# Patient Record
Sex: FEMALE | Race: WHITE | NOT HISPANIC OR LATINO | Employment: UNEMPLOYED | URBAN - METROPOLITAN AREA
[De-identification: names, ages, dates, MRNs, and addresses within clinical notes are randomized per-mention and may not be internally consistent; named-entity substitution may affect disease eponyms.]

---

## 2022-03-14 ENCOUNTER — TELEPHONE (OUTPATIENT)
Dept: UROGYNECOLOGY | Facility: CLINIC | Age: 37
End: 2022-03-14
Payer: COMMERCIAL

## 2022-03-14 NOTE — TELEPHONE ENCOUNTER
----- Message from Cassandra Jaime sent at 3/14/2022  4:27 PM CDT -----  Contact: pt  Type: Needs Medical Advice    Who: Called: Pt     Best Call Back Number: [phone]    Inquiry/Question: pt states her Dr was sending over a referral last Tuesday and is trying to see if you have received it yet please advise  Thank you~

## 2022-06-30 ENCOUNTER — TELEPHONE (OUTPATIENT)
Dept: UROGYNECOLOGY | Facility: CLINIC | Age: 37
End: 2022-06-30
Payer: COMMERCIAL

## 2022-06-30 NOTE — TELEPHONE ENCOUNTER
----- Message from Keiry Bender sent at 6/30/2022  1:52 PM CDT -----  Contact: Faustina aranda/ Dr. Styles  Type: Needs Medical Advice    Who Called:  Faustina    Best Call Back Number: [phone]  '  Additional Information: Please call back regarding seeing patient for UTIs.  Thanks.

## 2022-06-30 NOTE — TELEPHONE ENCOUNTER
Called and informed that we do not do  Kidney stones or chronic uti's that would be referred to urology, we do prolapse bladders, cystocele, rectocele I.C. all conditions of bladder , pelvic floor disorders. states understanding

## 2024-07-09 DIAGNOSIS — Z00.00 ROUTINE HEALTH MAINTENANCE: Primary | ICD-10-CM

## 2024-07-09 DIAGNOSIS — Z76.89 ENCOUNTER TO ESTABLISH CARE: ICD-10-CM

## 2024-09-04 ENCOUNTER — OFFICE VISIT (OUTPATIENT)
Dept: CARDIOLOGY | Facility: CLINIC | Age: 39
End: 2024-09-04
Payer: COMMERCIAL

## 2024-09-04 ENCOUNTER — CLINICAL SUPPORT (OUTPATIENT)
Dept: CARDIOLOGY | Facility: CLINIC | Age: 39
End: 2024-09-04
Payer: COMMERCIAL

## 2024-09-04 VITALS
SYSTOLIC BLOOD PRESSURE: 126 MMHG | OXYGEN SATURATION: 99 % | BODY MASS INDEX: 20.71 KG/M2 | HEIGHT: 63 IN | DIASTOLIC BLOOD PRESSURE: 82 MMHG | WEIGHT: 116.88 LBS | HEART RATE: 91 BPM

## 2024-09-04 DIAGNOSIS — R06.02 SOB (SHORTNESS OF BREATH): Primary | ICD-10-CM

## 2024-09-04 DIAGNOSIS — I49.3 PVC (PREMATURE VENTRICULAR CONTRACTION): ICD-10-CM

## 2024-09-04 DIAGNOSIS — Z76.89 ENCOUNTER TO ESTABLISH CARE: ICD-10-CM

## 2024-09-04 DIAGNOSIS — Z00.00 ROUTINE HEALTH MAINTENANCE: ICD-10-CM

## 2024-09-04 DIAGNOSIS — I10 PRIMARY HYPERTENSION: ICD-10-CM

## 2024-09-04 DIAGNOSIS — R06.83 SNORES: ICD-10-CM

## 2024-09-04 LAB
OHS QRS DURATION: 88 MS
OHS QTC CALCULATION: 411 MS

## 2024-09-04 PROCEDURE — 93005 ELECTROCARDIOGRAM TRACING: CPT | Mod: S$GLB,,, | Performed by: INTERNAL MEDICINE

## 2024-09-04 PROCEDURE — 99999 PR PBB SHADOW E&M-EST. PATIENT-LVL III: CPT | Mod: PBBFAC,,, | Performed by: INTERNAL MEDICINE

## 2024-09-04 PROCEDURE — 99204 OFFICE O/P NEW MOD 45 MIN: CPT | Mod: S$GLB,,, | Performed by: INTERNAL MEDICINE

## 2024-09-04 PROCEDURE — 93010 ELECTROCARDIOGRAM REPORT: CPT | Mod: S$GLB,,, | Performed by: INTERNAL MEDICINE

## 2024-09-04 NOTE — PROGRESS NOTES
Subjective:   Patient ID:  Smiley Winkler is a 39 y.o. female who presents for evaluation of Dizziness (Typically notices this occurring when she has her period) and Palpitations (Typically notices this occurring when she has her period)      38 yo female, referred for variated BP by Dr. Richmond  [facility] HTN. 7 kids home school  BP check ed at doctors' office 165/100 and 140/90.  C/o palpitation worse with cycle. No chest pain  Some SOB at exertion. No orthopnea PND leg swelling. Walks.   No smoking and drinking  EKG reviewed by myself today reveals NSR nonspecific STT change, PVD  Father has PFO.  No f/h premature CAD  Snore   Taking HCTZ 12.5 mg daily for kidney stone      No results found for this or any previous visit.     No results found for this or any previous visit.       Past Medical History:   Diagnosis Date    Anemia 04/11/2014    Hematuria     Kidney stones     Primary hypertension 9/4/2024    Trauma 03/17/2014       Past Surgical History:   Procedure Laterality Date    cystoscope      DILATION AND CURETTAGE OF UTERUS  03/15/2019    [facility]    HYSTEROSCOPY W/ POLYPECTOMY         Social History     Tobacco Use    Smoking status: Never    Smokeless tobacco: Never   Substance Use Topics    Alcohol use: Yes    Drug use: No       Family History   Problem Relation Name Age of Onset    Asthma Mother      Hypertension Father      Stroke Father      Breast cancer Maternal Aunt      Diabetes Maternal Uncle      Diabetes Maternal Grandmother      Diabetes Paternal Grandmother         Review of Systems   Constitutional: Negative for decreased appetite, diaphoresis, fever, malaise/fatigue and night sweats.   HENT:  Negative for nosebleeds.    Eyes:  Negative for blurred vision and double vision.   Cardiovascular:  Positive for dyspnea on exertion and palpitations. Negative for chest pain, claudication, irregular heartbeat, leg swelling, near-syncope, orthopnea, paroxysmal nocturnal dyspnea and syncope.   Respiratory:   "Negative for cough, shortness of breath, sleep disturbances due to breathing, snoring, sputum production and wheezing.    Endocrine: Negative for cold intolerance and polyuria.   Hematologic/Lymphatic: Does not bruise/bleed easily.   Skin:  Negative for rash.   Musculoskeletal:  Negative for back pain, falls, joint pain, joint swelling and neck pain.   Gastrointestinal:  Negative for abdominal pain, heartburn, nausea and vomiting.   Genitourinary:  Negative for dysuria, frequency and hematuria.   Neurological:  Negative for difficulty with concentration, dizziness, focal weakness, headaches, light-headedness, numbness, seizures and weakness.   Psychiatric/Behavioral:  Negative for depression, memory loss and substance abuse. The patient does not have insomnia.    Allergic/Immunologic: Negative for HIV exposure and hives.       Objective:   Physical Exam  HENT:      Head: Normocephalic.   Eyes:      Pupils: Pupils are equal, round, and reactive to light.   Neck:      Thyroid: No thyromegaly.      Vascular: Normal carotid pulses. No carotid bruit or JVD.   Cardiovascular:      Rate and Rhythm: Normal rate and regular rhythm. No extrasystoles are present.     Chest Wall: PMI is not displaced.      Pulses: Normal pulses.      Heart sounds: Normal heart sounds. No murmur heard.     No gallop. No S3 sounds.   Pulmonary:      Effort: No respiratory distress.      Breath sounds: Normal breath sounds. No stridor.   Abdominal:      General: Bowel sounds are normal.      Palpations: Abdomen is soft.      Tenderness: There is no abdominal tenderness. There is no rebound.   Musculoskeletal:         General: Normal range of motion.   Skin:     Findings: No rash.   Neurological:      Mental Status: She is alert and oriented to person, place, and time.   Psychiatric:         Behavior: Behavior normal.         No results found for: "CHOL"  No results found for: "HDL"  No results found for: "LDLCALC"  No results found for: "TRIG"  No " "results found for: "CHOLHDL"    Chemistry        Component Value Date/Time     01/30/2024 1132    K 3.8 01/30/2024 1132    CO2 25 01/30/2024 1132    BUN 12 01/30/2024 1132    CREATININE 0.82 01/30/2024 1132        Component Value Date/Time    CALCIUM 9.3 01/30/2024 1132    ALKPHOS 55 04/07/2023 2300    AST 9 (L) 04/07/2023 2300    ALT 6 04/07/2023 2300    BILITOT 0.3 (L) 04/07/2023 2300          No results found for: "LABA1C", "HGBA1C"  Lab Results   Component Value Date    TSH 1.378 07/22/2024     No results found for: "INR", "PROTIME"  Lab Results   Component Value Date    WBC 12.11 (A) 09/08/2023    HGB 11.1 (A) 09/08/2023    HCT 34.3 (A) 09/08/2023    MCV 85.8 09/08/2023     09/08/2023     BNP  @LABRCNTIP(BNP,BNPTRIAGEBLO)@  CrCl cannot be calculated (Patient's most recent lab result is older than the maximum 7 days allowed.).  No results found in the last 24 hours.  No results found in the last 24 hours.  No results found in the last 24 hours.    Assessment:      1. SOB (shortness of breath)    2. Primary hypertension    3. PVC (premature ventricular contraction)    4. Snores        Plan:   ECHO for SOB  VITALS for PVCs  Continue HCTZ  Consider sleep consult      Counseled DASH  Recommend heart-healthy diet, weight control and regular exercise.  Davis. Risk modification.   I have reviewed all pertinent labs and cardiac studies independently. Plans and recommendations have been formulated under my direct supervision. All questions answered and patient voiced understanding.   If symptoms persist go to the ED  RTC as needed              "

## 2024-09-13 ENCOUNTER — HOSPITAL ENCOUNTER (OUTPATIENT)
Dept: CARDIOLOGY | Facility: HOSPITAL | Age: 39
Discharge: HOME | End: 2024-09-13
Attending: INTERNAL MEDICINE
Payer: COMMERCIAL

## 2024-09-13 VITALS
BODY MASS INDEX: 20.55 KG/M2 | WEIGHT: 116 LBS | HEIGHT: 63 IN | SYSTOLIC BLOOD PRESSURE: 126 MMHG | DIASTOLIC BLOOD PRESSURE: 82 MMHG

## 2024-09-13 DIAGNOSIS — R06.02 SOB (SHORTNESS OF BREATH): ICD-10-CM

## 2024-09-13 DIAGNOSIS — I49.3 PVC (PREMATURE VENTRICULAR CONTRACTION): ICD-10-CM

## 2024-09-13 LAB
AORTIC ROOT ANNULUS: 3.05 CM
ASCENDING AORTA: 2.99 CM
AV INDEX (PROSTH): 0.77
AV MEAN GRADIENT: 3 MMHG
AV PEAK GRADIENT: 6 MMHG
AV VALVE AREA BY VELOCITY RATIO: 2.61 CM²
AV VALVE AREA: 2.69 CM²
AV VELOCITY RATIO: 0.75
BSA FOR ECHO PROCEDURE: 1.53 M2
CV ECHO LV RWT: 0.41 CM
DOP CALC AO PEAK VEL: 1.22 M/S
DOP CALC AO VTI: 26.5 CM
DOP CALC LVOT AREA: 3.5 CM2
DOP CALC LVOT DIAMETER: 2.11 CM
DOP CALC LVOT PEAK VEL: 0.91 M/S
DOP CALC LVOT STROKE VOLUME: 71.3 CM3
DOP CALC RVOT PEAK VEL: 0.6 M/S
DOP CALC RVOT VTI: 15.3 CM
DOP CALCLVOT PEAK VEL VTI: 20.4 CM
E WAVE DECELERATION TIME: 153 MSEC
E/A RATIO: 1.16
E/E' RATIO: 8.26 M/S
ECHO LV POSTERIOR WALL: 1.01 CM (ref 0.6–1.1)
EJECTION FRACTION: 60 %
FRACTIONAL SHORTENING: 34 % (ref 28–44)
INTERVENTRICULAR SEPTUM: 0.99 CM (ref 0.6–1.1)
IVC DIAMETER: 1.91 CM
IVRT: 95.15 MSEC
LA MAJOR: 4.46 CM
LA MINOR: 5.09 CM
LA WIDTH: 3.9 CM
LEFT ATRIUM AREA SYSTOLIC (APICAL 2 CHAMBER): 17.7 CM2
LEFT ATRIUM AREA SYSTOLIC (APICAL 4 CHAMBER): 13 CM2
LEFT ATRIUM SIZE: 3.1 CM
LEFT ATRIUM VOLUME INDEX MOD: 27.3 ML/M2
LEFT ATRIUM VOLUME INDEX: 31.9 ML/M2
LEFT ATRIUM VOLUME MOD: 41.78 CM3
LEFT ATRIUM VOLUME: 48.86 CM3
LEFT INTERNAL DIMENSION IN SYSTOLE: 3.31 CM (ref 2.1–4)
LEFT VENTRICLE DIASTOLIC VOLUME INDEX: 76.48 ML/M2
LEFT VENTRICLE DIASTOLIC VOLUME: 117.02 ML
LEFT VENTRICLE END SYSTOLIC VOLUME APICAL 2 CHAMBER: 48.31 ML
LEFT VENTRICLE END SYSTOLIC VOLUME APICAL 4 CHAMBER: 30.12 ML
LEFT VENTRICLE MASS INDEX: 118 G/M2
LEFT VENTRICLE SYSTOLIC VOLUME INDEX: 29 ML/M2
LEFT VENTRICLE SYSTOLIC VOLUME: 44.33 ML
LEFT VENTRICULAR INTERNAL DIMENSION IN DIASTOLE: 4.98 CM (ref 3.5–6)
LEFT VENTRICULAR MASS: 180.78 G
LV LATERAL E/E' RATIO: 8.64 M/S
LV SEPTAL E/E' RATIO: 7.92 M/S
LVED V (TEICH): 117.02 ML
LVES V (TEICH): 44.33 ML
LVOT MG: 1.74 MMHG
LVOT MV: 0.61 CM/S
MV PEAK A VEL: 0.82 M/S
MV PEAK E VEL: 0.95 M/S
MV STENOSIS PRESSURE HALF TIME: 44.37 MS
MV VALVE AREA P 1/2 METHOD: 4.96 CM2
OHS CV RV/LV RATIO: 0.76 CM
PISA TR MAX VEL: 2.01 M/S
PV MEAN GRADIENT: 1 MMHG
PV MV: 0.78 M/S
PV PEAK GRADIENT: 4 MMHG
PV PEAK VELOCITY: 1.04 M/S
RA MAJOR: 4.45 CM
RA PRESSURE ESTIMATED: 3 MMHG
RA WIDTH: 3.83 CM
RIGHT VENTRICULAR END-DIASTOLIC DIMENSION: 3.79 CM
RV TB RVSP: 5 MMHG
SINUS: 3.24 CM
STJ: 3.35 CM
TDI LATERAL: 0.11 M/S
TDI SEPTAL: 0.12 M/S
TDI: 0.12 M/S
TR MAX PG: 16 MMHG
TRICUSPID ANNULAR PLANE SYSTOLIC EXCURSION: 1.61 CM
TV REST PULMONARY ARTERY PRESSURE: 19 MMHG
Z-SCORE OF LEFT VENTRICULAR DIMENSION IN END DIASTOLE: 1.09
Z-SCORE OF LEFT VENTRICULAR DIMENSION IN END SYSTOLE: 1.41

## 2024-09-13 PROCEDURE — 93306 TTE W/DOPPLER COMPLETE: CPT | Mod: 26,,, | Performed by: STUDENT IN AN ORGANIZED HEALTH CARE EDUCATION/TRAINING PROGRAM

## 2024-09-13 PROCEDURE — 93306 TTE W/DOPPLER COMPLETE: CPT

## 2024-09-17 ENCOUNTER — TELEPHONE (OUTPATIENT)
Dept: CARDIOLOGY | Facility: CLINIC | Age: 39
End: 2024-09-17
Payer: COMMERCIAL

## 2024-09-17 NOTE — TELEPHONE ENCOUNTER
Spoke with pt in regards to test results. Pt verbalized understanding information without any concerns.               ----- Message from Jase Hastings MD sent at 9/16/2024  7:30 PM CDT -----  The Echo showed normal function  Continue current Rx.

## 2024-10-04 ENCOUNTER — TELEPHONE (OUTPATIENT)
Dept: CARDIOLOGY | Facility: CLINIC | Age: 39
End: 2024-10-04
Payer: COMMERCIAL

## 2024-10-04 NOTE — TELEPHONE ENCOUNTER
Jase Hastings MD [facility]  The monitor showed rare arrhythmia. Normal study    Spoke to patient verbalized understanding